# Patient Record
Sex: MALE | Race: WHITE | NOT HISPANIC OR LATINO | Employment: FULL TIME | URBAN - METROPOLITAN AREA
[De-identification: names, ages, dates, MRNs, and addresses within clinical notes are randomized per-mention and may not be internally consistent; named-entity substitution may affect disease eponyms.]

---

## 2023-08-21 ENCOUNTER — HOSPITAL ENCOUNTER (OUTPATIENT)
Dept: DATA CONVERSION | Facility: HOSPITAL | Age: 68
End: 2023-08-21

## 2023-08-21 DIAGNOSIS — Z12.2 ENCOUNTER FOR SCREENING FOR MALIGNANT NEOPLASM OF RESPIRATORY ORGANS: ICD-10-CM

## 2023-08-24 ENCOUNTER — HOSPITAL ENCOUNTER (OUTPATIENT)
Dept: DATA CONVERSION | Facility: HOSPITAL | Age: 68
Discharge: HOME | End: 2023-08-24
Payer: COMMERCIAL

## 2023-08-24 DIAGNOSIS — Z13.6 ENCOUNTER FOR SCREENING FOR CARDIOVASCULAR DISORDERS: ICD-10-CM

## 2023-09-19 ENCOUNTER — HOSPITAL ENCOUNTER (OUTPATIENT)
Dept: DATA CONVERSION | Facility: HOSPITAL | Age: 68
Discharge: HOME | End: 2023-09-19
Payer: COMMERCIAL

## 2023-09-19 DIAGNOSIS — E78.2 MIXED HYPERLIPIDEMIA: ICD-10-CM

## 2023-09-19 DIAGNOSIS — E55.9 VITAMIN D DEFICIENCY, UNSPECIFIED: ICD-10-CM

## 2023-09-19 DIAGNOSIS — R73.03 PREDIABETES: ICD-10-CM

## 2023-09-19 DIAGNOSIS — Z01.89 ENCOUNTER FOR OTHER SPECIFIED SPECIAL EXAMINATIONS: ICD-10-CM

## 2023-09-19 DIAGNOSIS — Z12.5 ENCOUNTER FOR SCREENING FOR MALIGNANT NEOPLASM OF PROSTATE: ICD-10-CM

## 2023-09-19 LAB
25(OH)D3 SERPL-MCNC: 31 NG/ML (ref 31–100)
ALBUMIN SERPL-MCNC: 4 GM/DL (ref 3.5–5)
ALBUMIN/GLOB SERPL: 1.3 RATIO (ref 1.5–3)
ALP BLD-CCNC: 91 U/L (ref 35–125)
ALT SERPL-CCNC: 9 U/L (ref 5–40)
ANION GAP SERPL CALCULATED.3IONS-SCNC: 13 MMOL/L (ref 0–19)
APPEARANCE PLAS: NORMAL
AST SERPL-CCNC: 17 U/L (ref 5–40)
BASOPHILS # BLD AUTO: 0.09 K/UL (ref 0–0.22)
BASOPHILS NFR BLD AUTO: 0.9 % (ref 0–1)
BILIRUB DIRECT SERPL-MCNC: 0.2 MG/DL (ref 0–0.2)
BILIRUB INDIRECT SERPL-MCNC: 0.1 MG/DL (ref 0–0.8)
BILIRUB SERPL-MCNC: 0.3 MG/DL (ref 0.1–1.2)
BUN SERPL-MCNC: 13 MG/DL (ref 8–25)
BUN/CREAT SERPL: 16.3 RATIO (ref 8–21)
CALCIUM SERPL-MCNC: 9.6 MG/DL (ref 8.5–10.4)
CHLORIDE SERPL-SCNC: 107 MMOL/L (ref 97–107)
CHOLEST SERPL-MCNC: 189 MG/DL (ref 133–200)
CHOLEST/HDLC SERPL: 2.8 RATIO
CO2 SERPL-SCNC: 23 MMOL/L (ref 24–31)
COLOR SPUN FLD: NORMAL
CREAT SERPL-MCNC: 0.8 MG/DL (ref 0.4–1.6)
DEPRECATED RDW RBC AUTO: 49.1 FL (ref 37–54)
DIFFERENTIAL METHOD BLD: ABNORMAL
EOSINOPHIL # BLD AUTO: 0.16 K/UL (ref 0–0.45)
EOSINOPHIL NFR BLD: 1.7 % (ref 0–3)
ERYTHROCYTE [DISTWIDTH] IN BLOOD BY AUTOMATED COUNT: 13.2 % (ref 11.7–15)
FASTING STATUS PATIENT QL REPORTED: NORMAL
GFR SERPL CREATININE-BSD FRML MDRD: 98 ML/MIN/1.73 M2
GLOBULIN SER-MCNC: 3.2 G/DL (ref 1.9–3.7)
GLUCOSE SERPL-MCNC: 92 MG/DL (ref 65–99)
HBA1C MFR BLD: 5.6 % (ref 4–6)
HCT VFR BLD AUTO: 46.9 % (ref 41–50)
HDLC SERPL-MCNC: 67 MG/DL
HGB BLD-MCNC: 15.4 GM/DL (ref 13.5–16.5)
IMM GRANULOCYTES # BLD AUTO: 0.03 K/UL (ref 0–0.1)
LDLC SERPL CALC-MCNC: 107 MG/DL (ref 65–130)
LYMPHOCYTES # BLD AUTO: 2.05 K/UL (ref 1.2–3.2)
LYMPHOCYTES NFR BLD MANUAL: 21.3 % (ref 20–40)
MCH RBC QN AUTO: 32.8 PG (ref 26–34)
MCHC RBC AUTO-ENTMCNC: 32.8 % (ref 31–37)
MCV RBC AUTO: 100 FL (ref 80–100)
MONOCYTES # BLD AUTO: 0.88 K/UL (ref 0–0.8)
MONOCYTES NFR BLD MANUAL: 9.2 % (ref 0–8)
NEUTROPHILS # BLD AUTO: 6.4 K/UL
NEUTROPHILS # BLD AUTO: 6.4 K/UL (ref 1.8–7.7)
NEUTROPHILS.IMMATURE NFR BLD: 0.3 % (ref 0–1)
NEUTS SEG NFR BLD: 66.6 % (ref 50–70)
NRBC BLD-RTO: 0 /100 WBC
PLATELET # BLD AUTO: 295 K/UL (ref 150–450)
PMV BLD AUTO: 11.7 CU (ref 7–12.6)
POTASSIUM SERPL-SCNC: 4 MMOL/L (ref 3.4–5.1)
PROT SERPL-MCNC: 7.2 G/DL (ref 5.9–7.9)
PSA SERPL-MCNC: 2.7 NG/ML (ref 0–4.1)
RBC # BLD AUTO: 4.69 M/UL (ref 4.5–5.5)
SODIUM SERPL-SCNC: 143 MMOL/L (ref 133–145)
TRIGL SERPL-MCNC: 73 MG/DL (ref 40–150)
WBC # BLD AUTO: 9.6 K/UL (ref 4.5–11)

## 2023-10-03 PROBLEM — I10 ESSENTIAL HYPERTENSION: Status: ACTIVE | Noted: 2023-10-03

## 2023-10-03 PROBLEM — R73.03 PREDIABETES: Status: ACTIVE | Noted: 2023-10-03

## 2023-10-03 PROBLEM — E78.2 MIXED HYPERLIPIDEMIA: Status: ACTIVE | Noted: 2023-10-03

## 2023-10-03 PROBLEM — K21.9 GASTROESOPHAGEAL REFLUX DISEASE WITHOUT ESOPHAGITIS: Status: ACTIVE | Noted: 2023-10-03

## 2023-10-03 RX ORDER — OMEPRAZOLE 20 MG/1
20 CAPSULE, DELAYED RELEASE ORAL 2 TIMES DAILY
COMMUNITY
Start: 2023-09-20

## 2023-10-03 RX ORDER — ROSUVASTATIN CALCIUM 40 MG/1
40 TABLET, COATED ORAL
COMMUNITY
Start: 2023-09-20 | End: 2023-11-09 | Stop reason: SDUPTHER

## 2023-10-03 RX ORDER — METOPROLOL SUCCINATE 25 MG/1
25 TABLET, EXTENDED RELEASE ORAL DAILY
COMMUNITY
Start: 2023-09-20 | End: 2023-11-09 | Stop reason: SDUPTHER

## 2023-10-04 ENCOUNTER — HOSPITAL ENCOUNTER (OUTPATIENT)
Dept: RADIOLOGY | Facility: HOSPITAL | Age: 68
Discharge: HOME | End: 2023-10-04
Payer: MEDICARE

## 2023-10-04 DIAGNOSIS — Z12.2 ENCOUNTER FOR SCREENING FOR MALIGNANT NEOPLASM OF RESPIRATORY ORGANS: ICD-10-CM

## 2023-10-04 PROCEDURE — 75571 CT HRT W/O DYE W/CA TEST: CPT

## 2023-11-08 ENCOUNTER — TELEPHONE (OUTPATIENT)
Dept: PRIMARY CARE | Facility: CLINIC | Age: 68
End: 2023-11-08
Payer: MEDICARE

## 2023-11-08 NOTE — TELEPHONE ENCOUNTER
Per daughter, patient had a stent placed 2 weeks ago and cardiologist prescribed Relenta. Daughter states that this medication caused patient to have SOB , especially at night and when they discussed the issue with cardio they were advised to call PCP to see if he would be willing to prescribe an inhaler? Please advise.

## 2023-11-09 ENCOUNTER — TELEPHONE (OUTPATIENT)
Dept: PRIMARY CARE | Facility: CLINIC | Age: 68
End: 2023-11-09

## 2023-11-09 ENCOUNTER — TELEMEDICINE (OUTPATIENT)
Dept: PRIMARY CARE | Facility: CLINIC | Age: 68
End: 2023-11-09
Payer: MEDICARE

## 2023-11-09 DIAGNOSIS — I25.10 CORONARY ARTERY DISEASE INVOLVING NATIVE CORONARY ARTERY OF NATIVE HEART WITHOUT ANGINA PECTORIS: ICD-10-CM

## 2023-11-09 DIAGNOSIS — R06.02 SHORTNESS OF BREATH: Primary | ICD-10-CM

## 2023-11-09 DIAGNOSIS — Z95.5 HISTORY OF CORONARY ARTERY STENT PLACEMENT: ICD-10-CM

## 2023-11-09 DIAGNOSIS — I10 ESSENTIAL HYPERTENSION: ICD-10-CM

## 2023-11-09 DIAGNOSIS — E78.2 MIXED HYPERLIPIDEMIA: ICD-10-CM

## 2023-11-09 PROBLEM — Z72.0 TOBACCO USE: Status: ACTIVE | Noted: 2023-11-09

## 2023-11-09 PROCEDURE — 99441 PR PHYS/QHP TELEPHONE EVALUATION 5-10 MIN: CPT | Performed by: STUDENT IN AN ORGANIZED HEALTH CARE EDUCATION/TRAINING PROGRAM

## 2023-11-09 RX ORDER — TAMSULOSIN HYDROCHLORIDE 0.4 MG/1
0.8 CAPSULE ORAL DAILY
COMMUNITY

## 2023-11-09 RX ORDER — MULTIVIT-MIN/IRON FUM/FOLIC AC 7.5 MG-4
1 TABLET ORAL DAILY
COMMUNITY

## 2023-11-09 RX ORDER — LANOLIN ALCOHOL/MO/W.PET/CERES
100 CREAM (GRAM) TOPICAL 2 TIMES DAILY
COMMUNITY

## 2023-11-09 RX ORDER — METOPROLOL SUCCINATE 25 MG/1
25 TABLET, EXTENDED RELEASE ORAL DAILY
Start: 2023-11-09 | End: 2024-03-11 | Stop reason: SDUPTHER

## 2023-11-09 RX ORDER — ROSUVASTATIN CALCIUM 40 MG/1
40 TABLET, COATED ORAL DAILY
Start: 2023-11-09

## 2023-11-09 ASSESSMENT — ENCOUNTER SYMPTOMS
GASTROINTESTINAL NEGATIVE: 1
CARDIOVASCULAR NEGATIVE: 1
CONSTITUTIONAL NEGATIVE: 1
RESPIRATORY NEGATIVE: 1

## 2023-11-09 ASSESSMENT — PAIN SCALES - GENERAL: PAINLEVEL: 0-NO PAIN

## 2023-11-09 NOTE — PROGRESS NOTES
[facility]: [facility]  TELEHEALTH ENCOUNTER      Manny Hinson is a 68 y.o. male that is presenting today for Medication Problem.  This is a telehealth encounter with audio technology. Patient has consented to this type of visit. Duration of encounter: 5 minutes.    Assessment/Plan   Diagnoses and all orders for this visit:  Shortness of breath  -     Transthoracic Echo (TTE) Complete; Future  Coronary artery disease involving native coronary artery of native heart without angina pectoris  -     Transthoracic Echo (TTE) Complete; Future  -     aspirin 81 mg capsule; Take 81 mg by mouth once daily.  -     metoprolol succinate XL (Toprol-XL) 25 mg 24 hr tablet; Take 1 tablet (25 mg) by mouth once daily.  -     rosuvastatin (Crestor) 40 mg tablet; Take 1 tablet (40 mg) by mouth once daily.  -     ticagrelor (Brilinta) 90 mg tablet; Take 1 tablet (90 mg) by mouth 2 times a day.  History of coronary artery stent placement  -     Transthoracic Echo (TTE) Complete; Future  -     aspirin 81 mg capsule; Take 81 mg by mouth once daily.  -     metoprolol succinate XL (Toprol-XL) 25 mg 24 hr tablet; Take 1 tablet (25 mg) by mouth once daily.  -     rosuvastatin (Crestor) 40 mg tablet; Take 1 tablet (40 mg) by mouth once daily.  -     ticagrelor (Brilinta) 90 mg tablet; Take 1 tablet (90 mg) by mouth 2 times a day.  Essential hypertension  -     metoprolol succinate XL (Toprol-XL) 25 mg 24 hr tablet; Take 1 tablet (25 mg) by mouth once daily.  Mixed hyperlipidemia  -     rosuvastatin (Crestor) 40 mg tablet; Take 1 tablet (40 mg) by mouth once daily.  Tobacco use    - Patient dealing with significant shortness of breath following his intervention. I suspect that this is related to the brilinta; however, the patient states that he was instructed by his new [facility] cardiologist that this was not related to the brilinta and that he needed an inhaler.  - Differential diagnosis also includes pericardial effusion.  Will need to check an echocardiogram.  - Discussed with the patient at-length that, if he were to become short of breath at rest, that he should go to the ER. Patient agreeable.  - Plan of care also discussed with the patient's daughter at the patient's request. Patient's daughter voiced understanding and agreement with the plan.    Subjective   Patient received a cardiac stent the other week. Following this, he was started on multiple medications.    Today, the patient is noting significant shortness of breath since the intervention, and the patient is unsure if this is related to the medication or the intervention themselves. The patient denies associated chest discomfort or tightness. Patient admits that the breathing is worse when he lies flat.      Review of Systems   Constitutional: Negative.    Respiratory: Negative.     Cardiovascular: Negative.    Gastrointestinal: Negative.       Objective   There were no vitals filed for this visit.  There is no height or weight on file to calculate BMI.  Physical Exam  Vitals (Patient-reported vitals.) reviewed.   Constitutional:       Comments: This is a virtual / telehealth encounter; unable to perform physical exam.       Diagnostic Results   Lab Results   Component Value Date    GLUCOSE 92 09/19/2023    CALCIUM 9.6 09/19/2023     09/19/2023    K 4.0 09/19/2023    CO2 23 (L) 09/19/2023     09/19/2023    BUN 13 09/19/2023    CREATININE 0.8 09/19/2023     Lab Results   Component Value Date    ALT 9 09/19/2023    AST 17 09/19/2023    ALKPHOS 91 09/19/2023    BILITOT 0.3 09/19/2023     Lab Results   Component Value Date    WBC 9.6 09/19/2023    HGB 15.4 09/19/2023    HCT 46.9 09/19/2023    .0 09/19/2023     09/19/2023     Lab Results   Component Value Date    CHOL 189 09/19/2023    CHOL 204 (H) 10/03/2022    CHOL 197 12/28/2020     Lab Results   Component Value Date    HDL 67 09/19/2023    HDL 65 10/03/2022    HDL 65 12/28/2020     Lab Results  "  Component Value Date    LDLCALC 107 09/19/2023    LDLCALC 112 10/03/2022    LDLCALC 117 12/28/2020     Lab Results   Component Value Date    TRIG 73 09/19/2023    TRIG 133 10/03/2022    TRIG 77 12/28/2020     No components found for: \"CHOLHDL\"  Lab Results   Component Value Date    HGBA1C 5.6 09/19/2023     Other labs not included in the list above were reviewed either before or during this encounter.    History   No past medical history on file.  No past surgical history on file.  Family History   Problem Relation Name Age of Onset    Heart disease Mother      Cancer Father       Social History     Socioeconomic History    Marital status:      Spouse name: Not on file    Number of children: Not on file    Years of education: Not on file    Highest education level: Not on file   Occupational History    Not on file   Tobacco Use    Smoking status: Not on file    Smokeless tobacco: Not on file   Substance and Sexual Activity    Alcohol use: Not on file    Drug use: Not on file    Sexual activity: Not on file   Other Topics Concern    Not on file   Social History Narrative    Not on file     Social Determinants of Health     Financial Resource Strain: Not on file   Food Insecurity: Not on file   Transportation Needs: Not on file   Physical Activity: Not on file   Stress: Not on file   Social Connections: Not on file   Intimate Partner Violence: Not on file   Housing Stability: Not on file     No Known Allergies  Current Outpatient Medications on File Prior to Visit   Medication Sig Dispense Refill    AMLODIPINE BESYLATE, BULK, MISC 5 mg.      multivitamin with minerals tablet Take 1 tablet by mouth once daily.      omeprazole (PriLOSEC) 20 mg DR capsule Take 1 capsule (20 mg) by mouth twice a day.      thiamine 100 mg tablet Take 1 tablet (100 mg) by mouth 2 times a day.      [DISCONTINUED] aspirin 81 mg capsule Take 81 mg by mouth once daily.      [DISCONTINUED] metoprolol succinate XL (Toprol-XL) 25 mg 24 " hr tablet Take 1 tablet (25 mg) by mouth once daily.      [DISCONTINUED] rosuvastatin (Crestor) 40 mg tablet Take 1 tablet (40 mg) by mouth once daily.      [DISCONTINUED] ticagrelor (Brilinta) 90 mg tablet Take 1 tablet (90 mg) by mouth 2 times a day.      tamsulosin (Flomax) 0.4 mg 24 hr capsule Take 2 capsules (0.8 mg) by mouth once daily.       No current facility-administered medications on file prior to visit.     Immunization History   Administered Date(s) Administered    Influenza, injectable, MDCK, quadrivalent 11/16/2020    Influenza, injectable, quadrivalent 09/30/2014, 12/01/2015, 01/10/2017, 11/02/2017, 11/14/2018, 12/24/2019    Pfizer COVID-19 vaccine, bivalent, age 12 years and older (30 mcg/0.3 mL) 10/21/2022    Pfizer Purple Cap SARS-CoV-2 03/24/2021, 04/23/2021, 12/15/2021    Pneumococcal conjugate vaccine, 13-valent (PREVNAR 13) 11/14/2017    Pneumococcal polysaccharide vaccine, 23-valent, age 2 years and older (PNEUMOVAX 23) 11/14/2018    Tdap vaccine, age 7 year and older (BOOSTRIX) 09/30/2014     Patient's medical history was reviewed and updated either before or during this encounter.       Guillermo Fletcher MD

## 2023-11-09 NOTE — TELEPHONE ENCOUNTER
Daughter requesting new echo order. Called to schedule and was advised that order placed was for routine which will take at least 20 days. Requesting new order for STAT.

## 2023-11-10 ENCOUNTER — HOSPITAL ENCOUNTER (OUTPATIENT)
Dept: CARDIOLOGY | Facility: HOSPITAL | Age: 68
Discharge: HOME | End: 2023-11-10
Payer: MEDICARE

## 2023-11-10 DIAGNOSIS — R06.02 SHORTNESS OF BREATH: ICD-10-CM

## 2023-11-10 DIAGNOSIS — I25.10 CORONARY ARTERY DISEASE INVOLVING NATIVE CORONARY ARTERY OF NATIVE HEART WITHOUT ANGINA PECTORIS: ICD-10-CM

## 2023-11-10 DIAGNOSIS — I50.20 UNSPECIFIED SYSTOLIC (CONGESTIVE) HEART FAILURE (MULTI): ICD-10-CM

## 2023-11-10 DIAGNOSIS — Z95.5 HISTORY OF CORONARY ARTERY STENT PLACEMENT: ICD-10-CM

## 2023-11-10 PROCEDURE — 93306 TTE W/DOPPLER COMPLETE: CPT

## 2023-11-10 PROCEDURE — 93306 TTE W/DOPPLER COMPLETE: CPT | Performed by: INTERNAL MEDICINE

## 2023-11-10 NOTE — LETTER
November 27, 2023     Manny Hinson  [address] [facility]  [place] [address]      Dear Mr. Hinson:    Below are the results from your recent visit:    Resulted Orders   Transthoracic Echo (TTE) Complete   Result Value Ref Range    AV pk jazzy 1.15     LVOT diam 2.10     AV mn grad 3.0     MV E/A ratio 0.51     Tricuspid annular plane systolic excursion 2.6     LV biplane EF 56     LA vol index A/L 43.6     MV avg E/e' ratio 13.40     RV free wall pk S' 15.80     LVIDd 4.75     AV pk grad 5.3     Aortic Valve Area by Continuity of VTI 3.19     Aortic Valve Area by Continuity of Peak Velocity 3.34     LV A4C EF 54.0     Narrative               [place] [id number]             Phone [phone]    TRANSTHORACIC ECHOCARDIOGRAM REPORT       Patient Name:     MANNY HINSON      Reading Physician:   [id number] Manny Gracia MD  Study Date:       11/10/2023          Ordering Provider:   [id number] BRANDIE COLES  MRN/PID:          [id number]            Fellow:  Accession#:       [id number]        Nurse:  Date of           1955 / 68      Sonographer:         Manjeet Stanton RDCS  Birth/Age:        years  Gender:           M                   Additional Staff:  Height:           177.00 cm           Admit Date:  Weight:           78.00 kg            Admission Status:    Outpatient  BSA:              1.95 m2             Department Location: [facility]  Blood Pressure: 162 /82 mmHg    Study Type:    TRANSTHORACIC ECHO (TTE) COMPLETE  Diagnosis/ICD: Unspecified systolic (congestive) heart failure (CHF)-I50.20  Indication:    CHF/SOB  CPT Codes:     Echo Complete w Full [id number]    Patient History:  Smoker:            Current.  Diabetes:          Yes  Pertinent History: CAD, Chest Pain, CHF, COPD, Dyspnea, HTN, Hyperlipidemia and                     Syncope. ETOH, GERD,  [facility]-10/27/2023.    Study Detail: The following Echo studies were performed: 2D, M-Mode, Doppler and                color flow.       PHYSICIAN INTERPRETATION:  Left Ventricle: Left ventricular systolic function is normal, with an estimated ejection fraction of 55-60%. The calculated ejection fraction is normal at 56 % using the Mendez's Bi-plane MOD calculation. There are no regional wall motion abnormalities. The left ventricular cavity size is normal. There is mild to moderate concentric left ventricular hypertrophy. Spectral Doppler shows an impaired relaxation pattern of left ventricular diastolic filling.  Left Atrium: The left atrium is normal in size.  Right Ventricle: The right ventricle is normal in size. There is normal right ventriclar wall thickness. There is normal right ventricular global systolic function.  Right Atrium: The right atrium is normal in size.  Aortic Valve: The aortic valve appears structurally normal. The aortic valve appears tricuspid and non-restricted. There is no evidence of aortic valve regurgitation. The peak instantaneous gradient of the aortic valve is 5.3 mmHg. The mean gradient of the aortic valve is 3.0 mmHg. Mild to moderate dilatation of the aortic root and ascending thoracic aorts.  Mitral Valve: The mitral valve is normal in structure. There is normal mitral valve leaflet mobility. There is mild mitral annular calcification. There is no evidence of mitral valve regurgitation.  Tricuspid Valve: The tricuspid valve is structurally normal. There is normal tricuspid valve leaflet mobility. There is trace tricuspid regurgitation.  Pulmonic Valve: The pulmonic valve is structurally normal. There is no indication of pulmonic valve regurgitation.  Pericardium: There is no pericardial effusion noted.  Aorta: The aortic root is abnormal. There is mild dilatation of the aortic arch. There is mild dilatation of the ascending aorta. There is mild dilatation of the aortic  root.  Systemic Veins: The inferior vena cava appears mildly dilated. There is IVC inspiratory collapse greater than 50%.       CONCLUSIONS:   1. Left ventricular systolic function is normal with a 55-60% estimated ejection fraction.   2. Spectral Doppler shows an impaired relaxation pattern of left ventricular diastolic filling.   3. Mild to moderate dilatation of the aortic root and ascending thoracic aorts.    QUANTITATIVE DATA SUMMARY:  2D MEASUREMENTS:                            Normal Ranges:  IVSd:          1.20 cm    (0.6-1.1cm)  LVPWd:         1.17 cm    (0.6-1.1cm)  LVIDd:         4.75 cm    (3.9-5.9cm)  LVIDs:         3.05 cm  LV Mass Index: 108.5 g/m2  LV % FS        35.8 %    LA VOLUME:                                Normal Ranges:  LA Vol A4C:        68.0 ml    (22+/-6mL/m2)  LA Vol A2C:        88.5 ml  LA Vol BP:         85.0 ml  LA Vol Index A4C:  34.9ml/m2  LA Vol Index A2C:  45.4 ml/m2  LA Vol Index BP:   43.6 ml/m2  LA Area A4C:       20.0 cm2  LA Area A2C:       25.0 cm2  LA Major Axis A4C: 5.0 cm  LA Major Axis A2C: 6.0 cm  LA Volume Index:   40.0 ml/m2  LA Vol A4C:        60.0 ml  LA Vol A2C:        83.5 ml    RA VOLUME BY A/L METHOD:                        Normal Ranges:  RA Area A4C: 20.0 cm2    M-MODE MEASUREMENTS:                   Normal Ranges:  Ao Root: 3.30 cm (2.0-3.7cm)  LAs:     4.50 cm (2.7-4.0cm)    AORTA MEASUREMENTS:                       Normal Ranges:  Ao Sinus, d: 4.46 cm (2.1-3.5cm)  Asc Ao, d:   3.90 cm (2.1-3.4cm)    LV SYSTOLIC FUNCTION BY 2D PLANIMETRY (MOD):                      Normal Ranges:  EF-A4C View: 54.0 % (>=55%)  EF-A2C View: 56.4 %  EF-Biplane:  56.0 %    LV DIASTOLIC FUNCTION:                         Normal Ranges:  MV Peak E:    0.54 m/s (0.7-1.2 m/s)  MV Peak A:    1.06 m/s (0.42-0.7 m/s)  E/A Ratio:    0.51     (1.0-2.2)  MV e'         0.04 m/s (>8.0)  MV lateral e' 0.04 m/s  MV medial e'  0.04 m/s  E/e' Ratio:   13.40    (<8.0)    MITRAL VALVE:                   Normal Ranges:  MV DT: 144 msec (150-240msec)    AORTIC VALVE:                                    Normal Ranges:  AoV Vmax:                1.15 m/s (<=1.7m/s)  AoV Peak P.3 mmHg (<20mmHg)  AoV Mean PG:             3.0 mmHg (1.7-11.5mmHg)  LVOT Max Michael:            1.11 m/s (<=1.1m/s)  AoV VTI:                 23.90 cm (18-25cm)  LVOT VTI:                22.00 cm  LVOT Diameter:           2.10 cm  (1.8-2.4cm)  AoV Area, VTI:           3.19 cm2 (2.5-5.5cm2)  AoV Area,Vmax:           3.34 cm2 (2.5-4.5cm2)  AoV Dimensionless Index: 0.92       RIGHT VENTRICLE:  RV Basal 3.75 cm  RV Mid   2.88 cm  RV Major 7.1 cm  TAPSE:   25.8 mm  RV s'    0.16 m/s    TRICUSPID VALVE/RVSP:                    Normal Ranges:  IVC Diam: 2.24 cm    PULMONIC VALVE:                       Normal Ranges:  PV Max Michael: 0.9 m/s  (0.6-0.9m/s)  PV Max PG:  3.3 mmHg       [id number] Manny Gracia MD  Electronically signed on 2023 at 8:39:57 AM         ** Final **       Mild abnormalities on echocardiogram; however, these can be discussed at our next appointment.     If you have any questions or concerns, please don't hesitate to call.         Sincerely,    Guillermo Fletcher    [facility]chocardi[facility] 1

## 2023-11-11 LAB
AORTIC VALVE MEAN GRADIENT: 3
AORTIC VALVE PEAK VELOCITY: 1.15
AV PEAK GRADIENT: 5.3
AVA (PEAK VEL): 3.34
AVA (VTI): 3.19
EJECTION FRACTION APICAL 4 CHAMBER: 54
EJECTION FRACTION: 56
LEFT ATRIUM VOLUME AREA LENGTH INDEX BSA: 43.6
LEFT VENTRICLE INTERNAL DIMENSION DIASTOLE: 4.75 (ref 3.5–6)
LEFT VENTRICULAR OUTFLOW TRACT DIAMETER: 2.1
MITRAL VALVE E/A RATIO: 0.51
MITRAL VALVE E/E' RATIO: 13.4
RIGHT VENTRICLE FREE WALL PEAK S': 15.8
TRICUSPID ANNULAR PLANE SYSTOLIC EXCURSION: 2.6

## 2023-11-16 ENCOUNTER — OFFICE VISIT (OUTPATIENT)
Dept: PRIMARY CARE | Facility: CLINIC | Age: 68
End: 2023-11-16
Payer: MEDICARE

## 2023-11-16 VITALS
HEART RATE: 95 BPM | BODY MASS INDEX: 25.4 KG/M2 | DIASTOLIC BLOOD PRESSURE: 80 MMHG | WEIGHT: 177 LBS | TEMPERATURE: 98.4 F | OXYGEN SATURATION: 97 % | SYSTOLIC BLOOD PRESSURE: 126 MMHG

## 2023-11-16 DIAGNOSIS — Z23 ENCOUNTER FOR IMMUNIZATION: ICD-10-CM

## 2023-11-16 DIAGNOSIS — I25.118 ATHEROSCLEROSIS OF NATIVE CORONARY ARTERY OF NATIVE HEART WITH STABLE ANGINA PECTORIS (CMS-HCC): Primary | ICD-10-CM

## 2023-11-16 DIAGNOSIS — N40.1 BENIGN PROSTATIC HYPERPLASIA WITH INCOMPLETE BLADDER EMPTYING: ICD-10-CM

## 2023-11-16 DIAGNOSIS — R39.14 BENIGN PROSTATIC HYPERPLASIA WITH INCOMPLETE BLADDER EMPTYING: ICD-10-CM

## 2023-11-16 PROCEDURE — 1159F MED LIST DOCD IN RCRD: CPT | Performed by: NURSE PRACTITIONER

## 2023-11-16 PROCEDURE — G0009 ADMIN PNEUMOCOCCAL VACCINE: HCPCS | Performed by: NURSE PRACTITIONER

## 2023-11-16 PROCEDURE — 3074F SYST BP LT 130 MM HG: CPT | Performed by: NURSE PRACTITIONER

## 2023-11-16 PROCEDURE — 1126F AMNT PAIN NOTED NONE PRSNT: CPT | Performed by: NURSE PRACTITIONER

## 2023-11-16 PROCEDURE — 90471 IMMUNIZATION ADMIN: CPT | Performed by: NURSE PRACTITIONER

## 2023-11-16 PROCEDURE — G0008 ADMIN INFLUENZA VIRUS VAC: HCPCS | Performed by: NURSE PRACTITIONER

## 2023-11-16 PROCEDURE — 3079F DIAST BP 80-89 MM HG: CPT | Performed by: NURSE PRACTITIONER

## 2023-11-16 PROCEDURE — 99214 OFFICE O/P EST MOD 30 MIN: CPT | Performed by: NURSE PRACTITIONER

## 2023-11-16 PROCEDURE — 90662 IIV NO PRSV INCREASED AG IM: CPT | Performed by: NURSE PRACTITIONER

## 2023-11-16 RX ORDER — AMLODIPINE BESYLATE 5 MG/1
5 TABLET ORAL DAILY
COMMUNITY
End: 2024-04-30 | Stop reason: WASHOUT

## 2023-11-16 RX ORDER — FINASTERIDE 5 MG/1
5 TABLET, FILM COATED ORAL DAILY
COMMUNITY

## 2023-11-16 RX ORDER — ALBUTEROL SULFATE 90 UG/1
2 AEROSOL, METERED RESPIRATORY (INHALATION) EVERY 6 HOURS PRN
COMMUNITY

## 2023-11-16 ASSESSMENT — ENCOUNTER SYMPTOMS
DYSURIA: 0
HEMATURIA: 0
APPETITE CHANGE: 0
FREQUENCY: 0
VOMITING: 0
CHILLS: 0
CONFUSION: 0
FLANK PAIN: 0
FATIGUE: 0
PALPITATIONS: 0
OCCASIONAL FEELINGS OF UNSTEADINESS: 0
SHORTNESS OF BREATH: 1
ABDOMINAL PAIN: 0
LOSS OF SENSATION IN FEET: 0
COUGH: 0
CHEST TIGHTNESS: 0
DEPRESSION: 0
WHEEZING: 0
NAUSEA: 0
DIAPHORESIS: 0
FEVER: 0

## 2023-11-16 ASSESSMENT — PAIN SCALES - GENERAL: PAINLEVEL: 0-NO PAIN

## 2023-11-16 ASSESSMENT — PATIENT HEALTH QUESTIONNAIRE - PHQ9
2. FEELING DOWN, DEPRESSED OR HOPELESS: NOT AT ALL
SUM OF ALL RESPONSES TO PHQ9 QUESTIONS 1 AND 2: 0
1. LITTLE INTEREST OR PLEASURE IN DOING THINGS: NOT AT ALL

## 2023-11-16 NOTE — PROGRESS NOTES
[facility]: [facility]  PROGRESS NOTE      Manny Hinson is a 68 y.o. male that is presenting today for [facility] Hospitalization follow up. 10/27 - 10/28/23. 67 yo male presented for scheduled cardiac catheterization. Per patient, he had approximately a year of ongoing SOB with progressive worsening. His PCP recommended further evaluation with cardiology who advised cardiac catheterization. He underwent left heart catheterization with angioplasty on 10/27/23. Patient was evaluated by Urology afterward due to c/o penile pain with urination. Per nursing  patient unable to void even with urge. Upon standing, patient was able to void 600 ml with post void residual of 470 ml. He was advised to begin timed and double voiding. Tamsulosin 0.4 mg daily started. He was instructed to follow with Urology (Dr. Ugo Trejo) as outpatient. Dx BPH with obstruction.lower urinary tract symptoms. Urinary retention. Atherosclerosis of native coronary artery of native heart with unstable angina pectoris. Stable angina.    Assessment/Plan   Diagnoses and all orders for this visit:  Atherosclerosis of native coronary artery of native heart with stable angina pectoris (CMS/[facility])       -  follow up with established cardiologist  Benign prostatic hyperplasia with incomplete bladder emptying        -  voiding improving. Continue Flomax and start Proscar as directed. Follow up with established urologist.  Subjective   Patient reports he followed up with Cardiologist, Dr. Lazaro, yesterday. No medication changes. Follow up in 3 months.  Patient reports he has been taking Tamsulosin daily. He had labs done yesterday for renal function and will be starting Proscar tomorrow per urology.  He reports urinary stream varies in intensity, worse first thing in the morning.       Review of Systems   Constitutional:  Negative for appetite change, chills, diaphoresis, fatigue and fever.   Respiratory:  Positive for shortness of breath  (improving, trying to reduce cigarette consumption). Negative for cough, chest tightness and wheezing.    Cardiovascular:  Negative for chest pain, palpitations and leg swelling.   Gastrointestinal:  Negative for abdominal pain, nausea and vomiting.   Genitourinary:  Negative for dysuria, flank pain, frequency, hematuria and urgency.   Psychiatric/Behavioral:  Negative for confusion.       Objective   Vitals:    11/16/23 1133   BP: 126/80   Pulse: 95   Temp: 36.9 °C (98.4 °F)   SpO2: 97%      Body mass index is 25.4 kg/m².  Physical Exam  Constitutional:       General: He is not in acute distress.     Appearance: Normal appearance. He is normal weight. He is not ill-appearing.   Neck:      Vascular: No carotid bruit.   Cardiovascular:      Rate and Rhythm: Normal rate and regular rhythm.      Heart sounds: No murmur heard.  Pulmonary:      Effort: Pulmonary effort is normal.      Breath sounds: Normal breath sounds. No wheezing.   Abdominal:      General: There is no distension.      Palpations: Abdomen is soft.      Tenderness: There is no abdominal tenderness. There is no right CVA tenderness or left CVA tenderness.   Musculoskeletal:      Cervical back: No tenderness.   Lymphadenopathy:      Cervical: No cervical adenopathy.   Skin:     General: Skin is warm and dry.   Neurological:      General: No focal deficit present.      Mental Status: He is alert. Mental status is at baseline.   Psychiatric:         Mood and Affect: Mood normal.         Behavior: Behavior normal.         Thought Content: Thought content normal.         Judgment: Judgment normal.       Diagnostic Results   Lab Results   Component Value Date    GLUCOSE 92 09/19/2023    CALCIUM 9.6 09/19/2023     09/19/2023    K 4.0 09/19/2023    CO2 23 (L) 09/19/2023     09/19/2023    BUN 13 09/19/2023    CREATININE 0.8 09/19/2023     Lab Results   Component Value Date    ALT 9 09/19/2023    AST 17 09/19/2023    ALKPHOS 91 09/19/2023    BILITOT  "0.3 09/19/2023     Lab Results   Component Value Date    WBC 9.6 09/19/2023    HGB 15.4 09/19/2023    HCT 46.9 09/19/2023    .0 09/19/2023     09/19/2023     Lab Results   Component Value Date    CHOL 189 09/19/2023    CHOL 204 (H) 10/03/2022    CHOL 197 12/28/2020     Lab Results   Component Value Date    HDL 67 09/19/2023    HDL 65 10/03/2022    HDL 65 12/28/2020     Lab Results   Component Value Date    LDLCALC 107 09/19/2023    LDLCALC 112 10/03/2022    LDLCALC 117 12/28/2020     Lab Results   Component Value Date    TRIG 73 09/19/2023    TRIG 133 10/03/2022    TRIG 77 12/28/2020     No components found for: \"CHOLHDL\"  Lab Results   Component Value Date    HGBA1C 5.6 09/19/2023     Other labs not included in the list above were reviewed either before or during this encounter.    History    No past medical history on file.  Past Surgical History:   Procedure Laterality Date    CARDIAC SURGERY       Family History   Problem Relation Name Age of Onset    Heart disease Mother      Cancer Father       Social History     Socioeconomic History    Marital status:      Spouse name: Not on file    Number of children: Not on file    Years of education: Not on file    Highest education level: Not on file   Occupational History    Not on file   Tobacco Use    Smoking status: Every Day     Packs/day: .5     Types: Cigarettes    Smokeless tobacco: Never   Substance and Sexual Activity    Alcohol use: Yes    Drug use: Never    Sexual activity: Not on file   Other Topics Concern    Not on file   Social History Narrative    Not on file     Social Determinants of Health     Financial Resource Strain: Not on file   Food Insecurity: Not on file   Transportation Needs: Not on file   Physical Activity: Not on file   Stress: Not on file   Social Connections: Not on file   Intimate Partner Violence: Not on file   Housing Stability: Not on file     No Known Allergies  Current Outpatient Medications on File Prior to " Visit   Medication Sig Dispense Refill    albuterol 90 mcg/actuation inhaler Inhale 2 puffs every 6 hours if needed for wheezing.      amLODIPine (Norvasc) 5 mg tablet Take 1 tablet (5 mg) by mouth once daily.      aspirin 81 mg capsule Take 81 mg by mouth once daily.      finasteride (Proscar) 5 mg tablet Take 1 tablet (5 mg) by mouth once daily. Do not crush, chew, or split.      metoprolol succinate XL (Toprol-XL) 25 mg 24 hr tablet Take 1 tablet (25 mg) by mouth once daily.      multivitamin with minerals tablet Take 1 tablet by mouth once daily.      omeprazole (PriLOSEC) 20 mg DR capsule Take 1 capsule (20 mg) by mouth twice a day.      rosuvastatin (Crestor) 40 mg tablet Take 1 tablet (40 mg) by mouth once daily.      tamsulosin (Flomax) 0.4 mg 24 hr capsule Take 2 capsules (0.8 mg) by mouth once daily.      thiamine 100 mg tablet Take 1 tablet (100 mg) by mouth 2 times a day.      ticagrelor (Brilinta) 90 mg tablet Take 1 tablet (90 mg) by mouth 2 times a day.      [DISCONTINUED] AMLODIPINE BESYLATE, BULK, MISC 5 mg.       No current facility-administered medications on file prior to visit.     Immunization History   Administered Date(s) Administered    Influenza, injectable, MDCK, quadrivalent 11/16/2020    Influenza, injectable, quadrivalent 09/30/2014, 12/01/2015, 01/10/2017, 11/02/2017, 11/14/2018, 12/24/2019    Pfizer COVID-19 vaccine, bivalent, age 12 years and older (30 mcg/0.3 mL) 10/21/2022    Pfizer Purple Cap SARS-CoV-2 03/24/2021, 04/23/2021, 12/15/2021    Pneumococcal conjugate vaccine, 13-valent (PREVNAR 13) 11/14/2017    Pneumococcal polysaccharide vaccine, 23-valent, age 2 years and older (PNEUMOVAX 23) 11/14/2018    Tdap vaccine, age 7 year and older (BOOSTRIX) 09/30/2014     Patient's medical history was reviewed and updated either before or during this encounter.       Becki Gibbons, APRN-CNP

## 2024-03-10 DIAGNOSIS — I10 ESSENTIAL HYPERTENSION: ICD-10-CM

## 2024-03-10 DIAGNOSIS — Z95.5 HISTORY OF CORONARY ARTERY STENT PLACEMENT: ICD-10-CM

## 2024-03-10 DIAGNOSIS — I25.10 CORONARY ARTERY DISEASE INVOLVING NATIVE CORONARY ARTERY OF NATIVE HEART WITHOUT ANGINA PECTORIS: ICD-10-CM

## 2024-03-11 RX ORDER — METOPROLOL SUCCINATE 25 MG/1
25 TABLET, EXTENDED RELEASE ORAL DAILY
Qty: 90 TABLET | Refills: 1 | Status: SHIPPED | OUTPATIENT
Start: 2024-03-11

## 2024-03-29 ENCOUNTER — APPOINTMENT (OUTPATIENT)
Dept: PRIMARY CARE | Facility: CLINIC | Age: 69
End: 2024-03-29
Payer: MEDICARE

## 2024-04-30 ENCOUNTER — OFFICE VISIT (OUTPATIENT)
Dept: PRIMARY CARE | Facility: CLINIC | Age: 69
End: 2024-04-30
Payer: MEDICARE

## 2024-04-30 VITALS
HEART RATE: 147 BPM | DIASTOLIC BLOOD PRESSURE: 82 MMHG | TEMPERATURE: 97.9 F | OXYGEN SATURATION: 97 % | WEIGHT: 170 LBS | BODY MASS INDEX: 24.39 KG/M2 | SYSTOLIC BLOOD PRESSURE: 122 MMHG

## 2024-04-30 DIAGNOSIS — R94.31 ABNORMAL EKG: ICD-10-CM

## 2024-04-30 DIAGNOSIS — R00.0 TACHYCARDIA: Primary | ICD-10-CM

## 2024-04-30 DIAGNOSIS — R06.02 SHORTNESS OF BREATH: ICD-10-CM

## 2024-04-30 PROCEDURE — 1160F RVW MEDS BY RX/DR IN RCRD: CPT | Performed by: NURSE PRACTITIONER

## 2024-04-30 PROCEDURE — 1159F MED LIST DOCD IN RCRD: CPT | Performed by: NURSE PRACTITIONER

## 2024-04-30 PROCEDURE — 4004F PT TOBACCO SCREEN RCVD TLK: CPT | Performed by: NURSE PRACTITIONER

## 2024-04-30 PROCEDURE — 99214 OFFICE O/P EST MOD 30 MIN: CPT | Performed by: NURSE PRACTITIONER

## 2024-04-30 PROCEDURE — 1125F AMNT PAIN NOTED PAIN PRSNT: CPT | Performed by: NURSE PRACTITIONER

## 2024-04-30 PROCEDURE — 93005 ELECTROCARDIOGRAM TRACING: CPT | Performed by: NURSE PRACTITIONER

## 2024-04-30 PROCEDURE — 3074F SYST BP LT 130 MM HG: CPT | Performed by: NURSE PRACTITIONER

## 2024-04-30 PROCEDURE — 93010 ELECTROCARDIOGRAM REPORT: CPT | Performed by: NURSE PRACTITIONER

## 2024-04-30 PROCEDURE — 3079F DIAST BP 80-89 MM HG: CPT | Performed by: NURSE PRACTITIONER

## 2024-04-30 RX ORDER — MONTELUKAST SODIUM 4 MG/1
1 TABLET, CHEWABLE ORAL
COMMUNITY

## 2024-04-30 ASSESSMENT — ENCOUNTER SYMPTOMS
PALPITATIONS: 0
ABDOMINAL PAIN: 0
SHORTNESS OF BREATH: 1
HEADACHES: 1
FATIGUE: 1
NAUSEA: 0
DIZZINESS: 0
COUGH: 0
SEIZURES: 0
FEVER: 0
CHILLS: 0
VOMITING: 0
LIGHT-HEADEDNESS: 0
CHEST TIGHTNESS: 0
SPEECH DIFFICULTY: 0
DIAPHORESIS: 0
WHEEZING: 0

## 2024-04-30 ASSESSMENT — PATIENT HEALTH QUESTIONNAIRE - PHQ9
1. LITTLE INTEREST OR PLEASURE IN DOING THINGS: NOT AT ALL
2. FEELING DOWN, DEPRESSED OR HOPELESS: NOT AT ALL
SUM OF ALL RESPONSES TO PHQ9 QUESTIONS 1 AND 2: 0

## 2024-04-30 ASSESSMENT — PAIN SCALES - GENERAL: PAINLEVEL: 5

## 2024-04-30 NOTE — PROGRESS NOTES
"[facility]: [facility]  PROGRESS NOTE      Manny Hinson is a 69 y.o. male that is presenting today with c/o severe HA, persistent SOB (ongoing for \"months\"), orthopnea, excessive belching, difficulty sleeping. He states his Cardiologist, Dr. Susy Núñez, at [facility] advised that his breathing (SOB) would improve with time as it was likely r/t his medications (Brilinta).  He underwent left heart catheterization with angioplasty 10/2023.  He states he has been taking his medications as prescribed.    Today his HR is 147 in the office. He denies CP, palpitations, dizziness, cough, lightheadedness, diaphoresis.    Assessment/Plan   Diagnoses and all orders for this visit:    Tachycardia  -     ECG 12 Lead demonstrated Sinus tachycardia (152 bpm) with short PT interval, atrial flutter.  Discussed with patients PCP, Dr. Guillermo Fletcher, and patient was advised to go to ED. He declined Ambulance and stated he would go to [place] ED.  I spoke with his daughter, Kate, via telephone, per patient request and advised her of patients status. I phoned report to [place] ED to BRANDON Carlin.  Today's notes and EKG will be faxed to ED.    Shortness of breath    Abnormal EKG    Subjective   HPI  Review of Systems   Constitutional:  Positive for fatigue. Negative for chills, diaphoresis and fever.   Respiratory:  Positive for shortness of breath. Negative for cough, chest tightness and wheezing.    Cardiovascular:  Negative for chest pain, palpitations and leg swelling.   Gastrointestinal:  Negative for abdominal pain, nausea and vomiting.   Neurological:  Positive for headaches. Negative for dizziness, seizures, syncope, speech difficulty and light-headedness.      Objective   Vitals:    04/30/24 1525   BP: 122/82   Pulse: (!) 147   Temp: 36.6 °C (97.9 °F)   SpO2: 97%      Body mass index is 24.39 kg/m².  Physical Exam  Constitutional:       General: He is not in acute distress.     Appearance: He is " "ill-appearing. He is not diaphoretic.   Cardiovascular:      Rate and Rhythm: Tachycardia present. Rhythm irregular.      Heart sounds: Normal heart sounds.   Pulmonary:      Effort: Respiratory distress (SOB) present.      Breath sounds: No stridor. No wheezing or rhonchi.      Comments: SOB with pulse ox 97% RA  Abdominal:      Palpations: Abdomen is soft.      Tenderness: There is no abdominal tenderness.   Musculoskeletal:      Cervical back: No tenderness.   Lymphadenopathy:      Cervical: No cervical adenopathy.   Skin:     General: Skin is warm and dry.   Neurological:      General: No focal deficit present.      Mental Status: He is alert. Mental status is at baseline.   Psychiatric:         Mood and Affect: Mood normal.       Diagnostic Results   Lab Results   Component Value Date    GLUCOSE 92 09/19/2023    CALCIUM 9.6 09/19/2023     09/19/2023    K 4.0 09/19/2023    CO2 23 (L) 09/19/2023     09/19/2023    BUN 13 09/19/2023    CREATININE 0.8 09/19/2023     Lab Results   Component Value Date    ALT 9 09/19/2023    AST 17 09/19/2023    ALKPHOS 91 09/19/2023    BILITOT 0.3 09/19/2023     Lab Results   Component Value Date    WBC 9.6 09/19/2023    HGB 15.4 09/19/2023    HCT 46.9 09/19/2023    .0 09/19/2023     09/19/2023     Lab Results   Component Value Date    CHOL 189 09/19/2023    CHOL 204 (H) 10/03/2022    CHOL 197 12/28/2020     Lab Results   Component Value Date    HDL 67 09/19/2023    HDL 65 10/03/2022    HDL 65 12/28/2020     Lab Results   Component Value Date    LDLCALC 107 09/19/2023    LDLCALC 112 10/03/2022    LDLCALC 117 12/28/2020     Lab Results   Component Value Date    TRIG 73 09/19/2023    TRIG 133 10/03/2022    TRIG 77 12/28/2020     No components found for: \"CHOLHDL\"  Lab Results   Component Value Date    HGBA1C 5.6 09/19/2023     Other labs not included in the list above were reviewed either before or during this encounter.    History    No past medical history " on file.  Past Surgical History:   Procedure Laterality Date    CARDIAC SURGERY       Family History   Problem Relation Name Age of Onset    Heart disease Mother      Cancer Father       Social History     Socioeconomic History    Marital status:      Spouse name: Not on file    Number of children: Not on file    Years of education: Not on file    Highest education level: Not on file   Occupational History    Not on file   Tobacco Use    Smoking status: Every Day     Current packs/day: 0.50     Types: Cigarettes    Smokeless tobacco: Never   Substance and Sexual Activity    Alcohol use: Yes    Drug use: Never    Sexual activity: Not on file   Other Topics Concern    Not on file   Social History Narrative    Not on file     Social Determinants of Health     Financial Resource Strain: Not on file   Food Insecurity: Not on file   Transportation Needs: Not on file   Physical Activity: Not on file   Stress: Not on file   Social Connections: Not on file   Intimate Partner Violence: Not on file   Housing Stability: Not on file     No Known Allergies  Current Outpatient Medications on File Prior to Visit   Medication Sig Dispense Refill    albuterol 90 mcg/actuation inhaler Inhale 2 puffs every 6 hours if needed for wheezing.      amLODIPine (Norvasc) 10 mg tablet TAKE ONE TABLET BY MOUTH EVERY DAY 90 tablet 3    aspirin 81 mg capsule Take 81 mg by mouth once daily.      colestipol (Colestid) 1 gram tablet Take 1 tablet (1 g) by mouth 2 times a day after meals. Take at least 1 hour after or 4 hours before other medications.      finasteride (Proscar) 5 mg tablet Take 1 tablet (5 mg) by mouth once daily. Do not crush, chew, or split.      metoprolol succinate XL (Toprol-XL) 25 mg 24 hr tablet TAKE ONE TABLET BY MOUTH EVERY DAY 90 tablet 1    multivitamin with minerals tablet Take 1 tablet by mouth once daily.      omeprazole (PriLOSEC) 20 mg DR capsule Take 1 capsule (20 mg) by mouth twice a day.      rosuvastatin  (Crestor) 40 mg tablet Take 1 tablet (40 mg) by mouth once daily.      tamsulosin (Flomax) 0.4 mg 24 hr capsule Take 2 capsules (0.8 mg) by mouth once daily.      thiamine 100 mg tablet Take 1 tablet (100 mg) by mouth 2 times a day.      ticagrelor (Brilinta) 90 mg tablet Take 1 tablet (90 mg) by mouth 2 times a day.      [DISCONTINUED] amLODIPine (Norvasc) 5 mg tablet Take 1 tablet (5 mg) by mouth once daily.       No current facility-administered medications on file prior to visit.     Immunization History   Administered Date(s) Administered    Flu vaccine, quadrivalent, high-dose, preservative free, age 65y+ (FLUZONE) 11/16/2023    Influenza, injectable, MDCK, quadrivalent 11/16/2020    Influenza, injectable, quadrivalent 09/30/2014, 12/01/2015, 01/10/2017, 11/02/2017, 11/14/2018, 12/24/2019    Pfizer COVID-19 vaccine, bivalent, age 12 years and older (30 mcg/0.3 mL) 10/21/2022    Pfizer Purple Cap SARS-CoV-2 03/24/2021, 04/23/2021, 12/15/2021    Pneumococcal conjugate vaccine, 13-valent (PREVNAR 13) 11/14/2017    Pneumococcal conjugate vaccine, 20-valent (PREVNAR 20) 11/16/2023    Pneumococcal polysaccharide vaccine, 23-valent, age 2 years and older (PNEUMOVAX 23) 11/14/2018    Tdap vaccine, age 7 year and older (BOOSTRIX, ADACEL) 09/30/2014     Patient's medical history was reviewed and updated either before or during this encounter.       Becki Gibbons, APRN-CNP

## 2024-05-06 DIAGNOSIS — Z72.0 TOBACCO USE: Primary | ICD-10-CM

## 2024-05-06 RX ORDER — IBUPROFEN 200 MG
1 TABLET ORAL EVERY 24 HOURS
Qty: 30 PATCH | Refills: 0 | Status: SHIPPED | OUTPATIENT
Start: 2024-05-06 | End: 2024-06-05

## 2024-05-06 RX ORDER — NICOTINE 7MG/24HR
1 PATCH, TRANSDERMAL 24 HOURS TRANSDERMAL EVERY 24 HOURS
Qty: 30 PATCH | Refills: 0 | Status: SHIPPED | OUTPATIENT
Start: 2024-05-06 | End: 2024-06-05

## 2024-05-30 DIAGNOSIS — F51.01 PRIMARY INSOMNIA: Primary | ICD-10-CM

## 2024-05-30 RX ORDER — TRAZODONE HYDROCHLORIDE 50 MG/1
50 TABLET ORAL NIGHTLY PRN
Qty: 30 TABLET | Refills: 1 | Status: SHIPPED | OUTPATIENT
Start: 2024-05-30 | End: 2025-05-30

## 2024-07-22 DIAGNOSIS — F51.01 PRIMARY INSOMNIA: ICD-10-CM

## 2024-07-23 RX ORDER — TRAZODONE HYDROCHLORIDE 50 MG/1
TABLET ORAL
Qty: 30 TABLET | Refills: 1 | Status: SHIPPED | OUTPATIENT
Start: 2024-07-23

## 2024-08-01 ENCOUNTER — DOCUMENTATION (OUTPATIENT)
Dept: PRIMARY CARE | Facility: CLINIC | Age: 69
End: 2024-08-01
Payer: MEDICARE

## 2024-08-01 ENCOUNTER — PATIENT OUTREACH (OUTPATIENT)
Dept: PRIMARY CARE | Facility: CLINIC | Age: 69
End: 2024-08-01
Payer: MEDICARE

## 2024-08-02 ENCOUNTER — DOCUMENTATION (OUTPATIENT)
Dept: PRIMARY CARE | Facility: CLINIC | Age: 69
End: 2024-08-02
Payer: MEDICARE

## 2024-08-02 ENCOUNTER — PATIENT OUTREACH (OUTPATIENT)
Dept: PRIMARY CARE | Facility: CLINIC | Age: 69
End: 2024-08-02
Payer: MEDICARE

## 2024-08-02 NOTE — PROGRESS NOTES
Discharge Facility: [facility]  Discharge Diagnosis: Rectal bleeding  Admission Date: 07/26/2024  Discharge Date: 08/01/2024    PCP Appointment Date: Tasked to office, no contact made  Specialist Appointment Date: Urology 08/07/2024, Cardio 08/29/2024  Hospital Encounter and Summary Linked: Yes      Two attempts were made to reach patient within two business days after discharge. Voicemail left with contact information for patient to call back with any non-emergent questions or concerns.

## 2024-08-16 ENCOUNTER — PATIENT OUTREACH (OUTPATIENT)
Dept: PRIMARY CARE | Facility: CLINIC | Age: 69
End: 2024-08-16
Payer: MEDICARE

## 2024-09-01 DIAGNOSIS — K21.9 GASTROESOPHAGEAL REFLUX DISEASE WITHOUT ESOPHAGITIS: Primary | ICD-10-CM

## 2024-09-03 RX ORDER — OMEPRAZOLE 20 MG/1
20 CAPSULE, DELAYED RELEASE ORAL 2 TIMES DAILY
Qty: 180 CAPSULE | Refills: 3 | Status: SHIPPED | OUTPATIENT
Start: 2024-09-03

## 2024-09-09 ENCOUNTER — PATIENT OUTREACH (OUTPATIENT)
Dept: PRIMARY CARE | Facility: CLINIC | Age: 69
End: 2024-09-09
Payer: MEDICARE

## 2024-09-15 DIAGNOSIS — Z95.5 HISTORY OF CORONARY ARTERY STENT PLACEMENT: ICD-10-CM

## 2024-09-15 DIAGNOSIS — I25.10 CORONARY ARTERY DISEASE INVOLVING NATIVE CORONARY ARTERY OF NATIVE HEART WITHOUT ANGINA PECTORIS: ICD-10-CM

## 2024-09-15 DIAGNOSIS — E78.2 MIXED HYPERLIPIDEMIA: ICD-10-CM

## 2024-09-16 RX ORDER — ROSUVASTATIN CALCIUM 40 MG/1
40 TABLET, COATED ORAL DAILY
Qty: 90 TABLET | Refills: 3 | Status: SHIPPED | OUTPATIENT
Start: 2024-09-16

## 2024-12-08 DIAGNOSIS — F51.01 PRIMARY INSOMNIA: ICD-10-CM

## 2024-12-09 RX ORDER — TRAZODONE HYDROCHLORIDE 50 MG/1
TABLET ORAL
Qty: 30 TABLET | Refills: 1 | Status: SHIPPED | OUTPATIENT
Start: 2024-12-09